# Patient Record
Sex: MALE | Race: ASIAN
[De-identification: names, ages, dates, MRNs, and addresses within clinical notes are randomized per-mention and may not be internally consistent; named-entity substitution may affect disease eponyms.]

---

## 2018-03-10 ENCOUNTER — HOSPITAL ENCOUNTER (OUTPATIENT)
Age: 73
Setting detail: OBSERVATION
LOS: 1 days | Discharge: HOME | End: 2018-03-11

## 2018-06-25 ENCOUNTER — HOSPITAL ENCOUNTER (EMERGENCY)
Dept: HOSPITAL 91 - E/R | Age: 73
Discharge: HOME | End: 2018-06-25
Payer: COMMERCIAL

## 2018-06-25 ENCOUNTER — HOSPITAL ENCOUNTER (EMERGENCY)
Age: 73
Discharge: HOME | End: 2018-06-25

## 2018-06-25 DIAGNOSIS — Z79.01: ICD-10-CM

## 2018-06-25 DIAGNOSIS — D64.9: ICD-10-CM

## 2018-06-25 DIAGNOSIS — R10.13: ICD-10-CM

## 2018-06-25 DIAGNOSIS — Z91.15: ICD-10-CM

## 2018-06-25 DIAGNOSIS — R55: ICD-10-CM

## 2018-06-25 DIAGNOSIS — Z95.1: ICD-10-CM

## 2018-06-25 DIAGNOSIS — S16.1XXA: Primary | ICD-10-CM

## 2018-06-25 DIAGNOSIS — I25.10: ICD-10-CM
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
## 2018-06-25 DIAGNOSIS — N18.9: ICD-10-CM

## 2018-06-25 DIAGNOSIS — E87.5: ICD-10-CM

## 2018-06-25 LAB
ADD MAN DIFF?: NO
ALANINE AMINOTRANSFERASE: 24 IU/L (ref 13–69)
ALBUMIN: 4.3 G/DL (ref 3.3–4.9)
ALKALINE PHOSPHATASE: 111 IU/L (ref 42–121)
ANION GAP: 20 (ref 8–16)
ASPARTATE AMINO TRANSFERASE: 26 IU/L (ref 15–46)
BASOPHIL #: 0 10^3/UL (ref 0–0.1)
BASOPHILS %: 0.3 % (ref 0–2)
BILIRUBIN,DIRECT: 0 MG/DL (ref 0–0.2)
BILIRUBIN,TOTAL: 0.3 MG/DL (ref 0.2–1.3)
BLOOD UREA NITROGEN: 77 MG/DL (ref 7–20)
CALCIUM: 9.7 MG/DL (ref 8.4–10.2)
CARBON DIOXIDE: 25 MMOL/L (ref 21–31)
CHLORIDE: 102 MMOL/L (ref 97–110)
CREATININE: 13.27 MG/DL (ref 0.61–1.24)
EOSINOPHILS #: 0.2 10^3/UL (ref 0–0.5)
EOSINOPHILS %: 2.8 % (ref 0–7)
GLUCOSE: 78 MG/DL (ref 70–220)
HEMATOCRIT: 36.9 % (ref 42–52)
HEMOGLOBIN: 11.5 G/DL (ref 14–18)
INR: 1.41
LIPASE: 337 U/L (ref 23–300)
LYMPHOCYTES #: 1.5 10^3/UL (ref 0.8–2.9)
LYMPHOCYTES %: 22.1 % (ref 15–51)
MEAN CORPUSCULAR HEMOGLOBIN: 30.7 PG (ref 29–33)
MEAN CORPUSCULAR HGB CONC: 31.2 G/DL (ref 32–37)
MEAN CORPUSCULAR VOLUME: 98.7 FL (ref 82–101)
MEAN PLATELET VOLUME: 9 FL (ref 7.4–10.4)
MONOCYTE #: 1.2 10^3/UL (ref 0.3–0.9)
MONOCYTES %: 17.6 % (ref 0–11)
NEUTROPHIL #: 3.9 10^3/UL (ref 1.6–7.5)
NEUTROPHILS %: 56.9 % (ref 39–77)
NUCLEATED RED BLOOD CELLS #: 0 10^3/UL (ref 0–0)
NUCLEATED RED BLOOD CELLS%: 0 /100WBC (ref 0–0)
PARTIAL THROMBOPLASTIN TIME: 36 SEC (ref 25–35)
PLATELET COUNT: 164 10^3/UL (ref 140–415)
POTASSIUM: 6 MMOL/L (ref 3.5–5.1)
PROTIME: 17.5 SEC (ref 11.9–14.9)
PT RATIO: 1.4
RED BLOOD COUNT: 3.74 10^6/UL (ref 4.7–6.1)
RED CELL DISTRIBUTION WIDTH: 14.2 % (ref 11.5–14.5)
SODIUM: 141 MMOL/L (ref 135–144)
TOTAL PROTEIN: 8.5 G/DL (ref 6.1–8.1)
WHITE BLOOD COUNT: 6.8 10^3/UL (ref 4.8–10.8)

## 2018-06-25 PROCEDURE — 85025 COMPLETE CBC W/AUTO DIFF WBC: CPT

## 2018-06-25 PROCEDURE — 36415 COLL VENOUS BLD VENIPUNCTURE: CPT

## 2018-06-25 PROCEDURE — 80048 BASIC METABOLIC PNL TOTAL CA: CPT

## 2018-06-25 PROCEDURE — 80076 HEPATIC FUNCTION PANEL: CPT

## 2018-06-25 PROCEDURE — 74176 CT ABD & PELVIS W/O CONTRAST: CPT

## 2018-06-25 PROCEDURE — 99285 EMERGENCY DEPT VISIT HI MDM: CPT

## 2018-06-25 PROCEDURE — 96375 TX/PRO/DX INJ NEW DRUG ADDON: CPT

## 2018-06-25 PROCEDURE — 94664 DEMO&/EVAL PT USE INHALER: CPT

## 2018-06-25 PROCEDURE — 83690 ASSAY OF LIPASE: CPT

## 2018-06-25 PROCEDURE — 71045 X-RAY EXAM CHEST 1 VIEW: CPT

## 2018-06-25 PROCEDURE — 93005 ELECTROCARDIOGRAM TRACING: CPT

## 2018-06-25 PROCEDURE — 72125 CT NECK SPINE W/O DYE: CPT

## 2018-06-25 PROCEDURE — 70450 CT HEAD/BRAIN W/O DYE: CPT

## 2018-06-25 PROCEDURE — 96374 THER/PROPH/DIAG INJ IV PUSH: CPT

## 2018-06-25 PROCEDURE — 85730 THROMBOPLASTIN TIME PARTIAL: CPT

## 2018-06-25 PROCEDURE — 85610 PROTHROMBIN TIME: CPT

## 2018-06-25 RX ADMIN — CALCIUM CHLORIDE 1 MG: 100 INJECTION INTRAVENOUS; INTRAVENTRICULAR at 10:51

## 2018-06-25 RX ADMIN — ACETAMINOPHEN 1 MG: 325 TABLET, FILM COATED ORAL at 09:35

## 2018-06-25 RX ADMIN — SODIUM BICARBONATE 1 ML: 84 INJECTION INTRAVENOUS at 10:51

## 2018-06-25 RX ADMIN — ALBUTEROL SULFATE 1 MG: 2.5 SOLUTION RESPIRATORY (INHALATION) at 11:11

## 2019-01-11 ENCOUNTER — HOSPITAL ENCOUNTER (OUTPATIENT)
Dept: HOSPITAL 91 - E/R | Age: 74
Setting detail: OBSERVATION
LOS: 1 days | Discharge: HOME | End: 2019-01-12
Payer: COMMERCIAL

## 2019-01-11 ENCOUNTER — HOSPITAL ENCOUNTER (OUTPATIENT)
Dept: HOSPITAL 10 - E/R | Age: 74
Setting detail: OBSERVATION
LOS: 1 days | Discharge: HOME | End: 2019-01-12
Attending: INTERNAL MEDICINE | Admitting: INTERNAL MEDICINE
Payer: COMMERCIAL

## 2019-01-11 VITALS — RESPIRATION RATE: 18 BRPM | SYSTOLIC BLOOD PRESSURE: 132 MMHG | HEART RATE: 75 BPM | DIASTOLIC BLOOD PRESSURE: 71 MMHG

## 2019-01-11 VITALS — DIASTOLIC BLOOD PRESSURE: 75 MMHG | HEART RATE: 87 BPM | SYSTOLIC BLOOD PRESSURE: 135 MMHG

## 2019-01-11 VITALS
HEIGHT: 64 IN | HEIGHT: 64 IN | WEIGHT: 160.94 LBS | BODY MASS INDEX: 27.48 KG/M2 | WEIGHT: 160.94 LBS | BODY MASS INDEX: 27.48 KG/M2

## 2019-01-11 VITALS — DIASTOLIC BLOOD PRESSURE: 80 MMHG | SYSTOLIC BLOOD PRESSURE: 126 MMHG | HEART RATE: 90 BPM

## 2019-01-11 VITALS — RESPIRATION RATE: 12 BRPM | HEART RATE: 75 BPM | DIASTOLIC BLOOD PRESSURE: 71 MMHG | SYSTOLIC BLOOD PRESSURE: 132 MMHG

## 2019-01-11 VITALS — HEART RATE: 85 BPM | SYSTOLIC BLOOD PRESSURE: 157 MMHG | DIASTOLIC BLOOD PRESSURE: 85 MMHG

## 2019-01-11 VITALS — SYSTOLIC BLOOD PRESSURE: 130 MMHG | HEART RATE: 98 BPM | DIASTOLIC BLOOD PRESSURE: 67 MMHG

## 2019-01-11 VITALS — HEART RATE: 88 BPM | SYSTOLIC BLOOD PRESSURE: 136 MMHG | DIASTOLIC BLOOD PRESSURE: 75 MMHG

## 2019-01-11 VITALS — SYSTOLIC BLOOD PRESSURE: 141 MMHG | HEART RATE: 77 BPM | DIASTOLIC BLOOD PRESSURE: 77 MMHG

## 2019-01-11 VITALS — HEART RATE: 86 BPM | SYSTOLIC BLOOD PRESSURE: 123 MMHG | DIASTOLIC BLOOD PRESSURE: 76 MMHG

## 2019-01-11 VITALS — SYSTOLIC BLOOD PRESSURE: 130 MMHG | DIASTOLIC BLOOD PRESSURE: 74 MMHG | HEART RATE: 78 BPM

## 2019-01-11 VITALS — SYSTOLIC BLOOD PRESSURE: 127 MMHG | HEART RATE: 84 BPM | DIASTOLIC BLOOD PRESSURE: 78 MMHG

## 2019-01-11 VITALS — HEART RATE: 85 BPM | DIASTOLIC BLOOD PRESSURE: 84 MMHG | SYSTOLIC BLOOD PRESSURE: 130 MMHG

## 2019-01-11 VITALS — RESPIRATION RATE: 17 BRPM | HEART RATE: 90 BPM | DIASTOLIC BLOOD PRESSURE: 62 MMHG | SYSTOLIC BLOOD PRESSURE: 128 MMHG

## 2019-01-11 VITALS — SYSTOLIC BLOOD PRESSURE: 111 MMHG | HEART RATE: 73 BPM | RESPIRATION RATE: 17 BRPM | DIASTOLIC BLOOD PRESSURE: 71 MMHG

## 2019-01-11 VITALS — HEART RATE: 78 BPM | SYSTOLIC BLOOD PRESSURE: 122 MMHG | DIASTOLIC BLOOD PRESSURE: 58 MMHG | RESPIRATION RATE: 18 BRPM

## 2019-01-11 VITALS — HEART RATE: 87 BPM

## 2019-01-11 VITALS — HEART RATE: 89 BPM | DIASTOLIC BLOOD PRESSURE: 72 MMHG | SYSTOLIC BLOOD PRESSURE: 142 MMHG

## 2019-01-11 VITALS — DIASTOLIC BLOOD PRESSURE: 79 MMHG | HEART RATE: 82 BPM | SYSTOLIC BLOOD PRESSURE: 141 MMHG

## 2019-01-11 VITALS — RESPIRATION RATE: 18 BRPM | DIASTOLIC BLOOD PRESSURE: 80 MMHG | HEART RATE: 92 BPM | SYSTOLIC BLOOD PRESSURE: 142 MMHG

## 2019-01-11 VITALS — SYSTOLIC BLOOD PRESSURE: 117 MMHG | HEART RATE: 82 BPM | DIASTOLIC BLOOD PRESSURE: 75 MMHG

## 2019-01-11 VITALS — HEART RATE: 84 BPM

## 2019-01-11 DIAGNOSIS — Z99.2: ICD-10-CM

## 2019-01-11 DIAGNOSIS — Z79.01: ICD-10-CM

## 2019-01-11 DIAGNOSIS — N18.6: ICD-10-CM

## 2019-01-11 DIAGNOSIS — I48.2: ICD-10-CM

## 2019-01-11 DIAGNOSIS — D63.1: ICD-10-CM

## 2019-01-11 DIAGNOSIS — I50.9: ICD-10-CM

## 2019-01-11 DIAGNOSIS — I13.2: Primary | ICD-10-CM

## 2019-01-11 DIAGNOSIS — I25.10: ICD-10-CM

## 2019-01-11 DIAGNOSIS — E87.5: ICD-10-CM

## 2019-01-11 LAB
ADD MAN DIFF?: NO
ANION GAP: 16 (ref 5–13)
BASOPHIL #: 0 10^3/UL (ref 0–0.1)
BASOPHILS %: 0.3 % (ref 0–2)
BLOOD UREA NITROGEN: 79 MG/DL (ref 7–20)
CALCIUM: 9.4 MG/DL (ref 8.4–10.2)
CARBON DIOXIDE: 17 MMOL/L (ref 21–31)
CHLORIDE: 108 MMOL/L (ref 97–110)
CREATININE: 12.76 MG/DL (ref 0.61–1.24)
EOSINOPHILS #: 0.4 10^3/UL (ref 0–0.5)
EOSINOPHILS %: 3.2 % (ref 0–7)
GLUCOSE: 93 MG/DL (ref 70–220)
HAAIG REFLEX: (no result)
HEMATOCRIT: 30.8 % (ref 42–52)
HEMOGLOBIN: 9.8 G/DL (ref 14–18)
HEPATITIS B CORE ANTIBODY: NEGATIVE
HEPATITIS B SURFACE ANTIGEN: NEGATIVE
HEPATITIS C VIRAL ANTIBODY: NEGATIVE
IMMATURE GRANS #M: 0.12 10^3/UL (ref 0–0.03)
IMMATURE GRANS % (M): 1 % (ref 0–0.43)
INR: 1.03
LACTIC ACID: 0.6 MMOL/L (ref 0.5–2)
LYMPHOCYTES #: 1.3 10^3/UL (ref 0.8–2.9)
LYMPHOCYTES %: 10.4 % (ref 15–51)
MEAN CORPUSCULAR HEMOGLOBIN: 30.1 PG (ref 29–33)
MEAN CORPUSCULAR HGB CONC: 31.8 G/DL (ref 32–37)
MEAN CORPUSCULAR VOLUME: 94.5 FL (ref 82–101)
MEAN PLATELET VOLUME: 9.6 FL (ref 7.4–10.4)
MONOCYTE #: 0.8 10^3/UL (ref 0.3–0.9)
MONOCYTES %: 6.6 % (ref 0–11)
NEUTROPHIL #: 9.9 10^3/UL (ref 1.6–7.5)
NEUTROPHILS %: 78.5 % (ref 39–77)
NUCLEATED RED BLOOD CELLS #: 0 10^3/UL (ref 0–0)
NUCLEATED RED BLOOD CELLS%: 0 /100WBC (ref 0–0)
PLATELET COUNT: 238 10^3/UL (ref 140–415)
POTASSIUM: 7.1 MMOL/L (ref 3.5–5.1)
PROTIME: 13.6 SEC (ref 11.9–14.9)
PT RATIO: 1.1
RED BLOOD COUNT: 3.26 10^6/UL (ref 4.7–6.1)
RED CELL DISTRIBUTION WIDTH: 15.9 % (ref 11.5–14.5)
SODIUM: 141 MMOL/L (ref 135–144)
TROPONIN-I: 0.03 NG/ML (ref 0–0.12)
WHITE BLOOD COUNT: 12.6 10^3/UL (ref 4.8–10.8)

## 2019-01-11 PROCEDURE — 85025 COMPLETE CBC W/AUTO DIFF WBC: CPT

## 2019-01-11 PROCEDURE — 83036 HEMOGLOBIN GLYCOSYLATED A1C: CPT

## 2019-01-11 PROCEDURE — 86709 HEPATITIS A IGM ANTIBODY: CPT

## 2019-01-11 PROCEDURE — G0257 UNSCHED DIALYSIS ESRD PT HOS: HCPCS

## 2019-01-11 PROCEDURE — 84484 ASSAY OF TROPONIN QUANT: CPT

## 2019-01-11 PROCEDURE — 80048 BASIC METABOLIC PNL TOTAL CA: CPT

## 2019-01-11 PROCEDURE — 90935 HEMODIALYSIS ONE EVALUATION: CPT

## 2019-01-11 PROCEDURE — 87340 HEPATITIS B SURFACE AG IA: CPT

## 2019-01-11 PROCEDURE — 87040 BLOOD CULTURE FOR BACTERIA: CPT

## 2019-01-11 PROCEDURE — 36415 COLL VENOUS BLD VENIPUNCTURE: CPT

## 2019-01-11 PROCEDURE — 86704 HEP B CORE ANTIBODY TOTAL: CPT

## 2019-01-11 PROCEDURE — 93005 ELECTROCARDIOGRAM TRACING: CPT

## 2019-01-11 PROCEDURE — 71045 X-RAY EXAM CHEST 1 VIEW: CPT

## 2019-01-11 PROCEDURE — 85610 PROTHROMBIN TIME: CPT

## 2019-01-11 PROCEDURE — 86803 HEPATITIS C AB TEST: CPT

## 2019-01-11 PROCEDURE — 83605 ASSAY OF LACTIC ACID: CPT

## 2019-01-11 PROCEDURE — 99291 CRITICAL CARE FIRST HOUR: CPT

## 2019-01-11 RX ADMIN — CEFTRIAXONE 1 MLS/HR: 1 INJECTION, SOLUTION INTRAVENOUS at 14:57

## 2019-01-11 RX ADMIN — EPOETIN ALFA 1 UNITS: 3000 SOLUTION INTRAVENOUS; SUBCUTANEOUS at 21:11

## 2019-01-11 RX ADMIN — RENAGEL 1 MG: 800 TABLET ORAL at 21:54

## 2019-01-11 RX ADMIN — WARFARIN SODIUM 1 MG: 2 TABLET ORAL at 21:52

## 2019-01-11 RX ADMIN — AZITHROMYCIN MONOHYDRATE 1 MLS/HR: 500 INJECTION, POWDER, LYOPHILIZED, FOR SOLUTION INTRAVENOUS at 14:58

## 2019-01-11 NOTE — ERD
ER Documentation


Chief Complaint


Chief Complaint





SOB, DIALYSIS PT, MISSED DIALYSIS SESSION X2





HPI


73-year-old male with a history of ESRD on hemodialysis brought in by his wife 


for shortness of breath.  Reportedly the patient was in the [facility] for over


1 month.  He was doing dialysis there, last one done was 2 days ago.  He was 


told prior to travel that when he comes back to the [place], to go immediately to 


the ER.  It is unclear why he was given this advice. He denies leg swelling, 


phlegm,  fever, chills, chest pain, palpitations, back pain, or dizziness.





ROS


All systems reviewed and are negative except as per history of present illness.





Medications


Home Meds


Reported Medications


Warfarin Sodium* (Coumadin*) 3 Mg Tablet, 3.5 MG PO EVERY OTHER DAY , TAB


   **ALTERNATE WITH COUMADIN 4 MG**


   1/11/19


Amiodarone Hcl* (Pacerone*) 100 Mg Tablet, 50 MG PO DAILY, TAB


   1/11/19


Warfarin Sodium* (Warfarin Sodium*) 4 Mg Tablet, 4 MG PO DAY 2 TO DAY 7, TAB


   6/25/18


Cholecalciferol* (Vitamin D3*) 1,000 Unit Tablet, 1000 UNIT PO DAILY, TAB


   6/25/18


Multivit/Ca Carb/B Cmplx/Fa* (Kena-Abdi*) 1 Tab Tab, 1 TAB PO DAILY


   6/25/18


Sevelamer Carbonate* (Renvela*) 2.4 Gm Powd.pack, 1 PACKET PO DAILY


   6/25/18


Sevelamer Hcl* (Renagel*) 800 Mg Tablet, 800 MG PO WITH MEALS, TAB


   8/9/15


Ferrous Sulfate* (Ferrous Sulfate*) 325 Mg Tabec, 325 MG PO DAILY, TAB


   8/9/15


Folic Acid* (Folic Acid*) 1 Mg Tablet, 1 MG PO DAILY, TAB


   8/9/15


Discontinued Reported Medications


Warfarin Sodium* (Coumadin*) 10 Mg Tablet, 10 MG PO DAY 1 THEN ALTERNATE, TAB


   6/25/18





Allergies


Allergies:  


Coded Allergies:  


     ciprofloxacin (Verified  Allergy, Unknown, 1/11/19)


   


   rashes


     ciprofloxacin HCl (Verified  Allergy, Unknown, 1/11/19)


   


   rashes


Uncoded Allergies:  


     NEOMYCIN OINTMENT (Allergy, Unknown, 6/19/14)





PMhx/Soc


History of Surgery:  Yes (ANGIOPLASTY, CABG, PROSTATE TURP, AV FISTULA)


Anesthesia Reaction:  No


Hx Neurological Disorder:  No


Hx Respiratory Disorders:  No


Hx Cardiac Disorders:  Yes (CAD, HTN)


Hx Psychiatric Problems:  No


Hx Miscellaneous Medical Probl:  Yes (GOUT, ESRD on HD MWF, prostate disease)


Hx Alcohol Use:  No


Hx Substance Use:  No


Hx Tobacco Use:  No


Smoking Status:  Never smoker





FmHx


Family History:  No diabetes





Physical Exam


Vitals





Vital Signs


  Date      Temp  Pulse  Resp  B/P (MAP)   Pulse Ox  O2          O2 Flow    FiO2


Time                                                 Delivery    Rate


   1/11/19  97.5     87    22      136/77        96


     12:51                           (96)





Physical Exam


Const:   No acute distress


Head:   Atraumatic 


Eyes:    Normal Conjunctiva


ENT:    Normal External Ears, Nose and Mouth.


Neck:               Full range of motion. No meningismus.


Resp:   No tachypnea. Diminished breath sounds bilaterally. No wheezing, rales 


or rhonchi


Cardio:   Regular rate and rhythm, no murmurs


Abd:    Soft, non tender, non distended. Normal bowel sounds


Skin:   No petechiae or rashes


Back:   No midline or flank tenderness


Ext:    No cyanosis, or edema. Bilateral knee swelling, chronic. No calf 


tenderness. 


Neur:   Awake and alert


Psych:    Normal Mood and Affect


Result Diagram:  


1/11/19 1320                                                                    


           1/12/19 0508





Results 24 hrs





Laboratory Tests


       Test
                                 1/11/19
13:20  1/11/19
13:53


       White Blood Count                     12.6 10^3/ul


       Red Blood Count                       3.26 10^6/ul


       Hemoglobin                                9.8 g/dl


       Hematocrit                                  30.8 %


       Mean Corpuscular Volume                    94.5 fl


       Mean Corpuscular Hemoglobin                30.1 pg


       Mean Corpuscular Hemoglobin
Concent     31.8 g/dl 
  



       Red Cell Distribution Width                 15.9 %


       Platelet Count                         238 10^3/UL


       Mean Platelet Volume                        9.6 fl


       Immature Granulocytes %                    1.000 %


       Neutrophils %                               78.5 %


       Lymphocytes %                               10.4 %


       Monocytes %                                  6.6 %


       Eosinophils %                                3.2 %


       Basophils %                                  0.3 %


       Nucleated Red Blood Cells %            0.0 /100WBC


       Immature Granulocytes #              0.120 10^3/ul


       Neutrophils #                          9.9 10^3/ul


       Lymphocytes #                          1.3 10^3/ul


       Monocytes #                            0.8 10^3/ul


       Eosinophils #                          0.4 10^3/ul


       Basophils #                            0.0 10^3/ul


       Nucleated Red Blood Cells #            0.0 10^3/ul


       POC Venous Lactate                                     1.3 mmol/L





Current Medications


 Medications
   Dose
          Sig/Tamiko
       Start Time
   Status  Last


 (Trade)       Ordered        Route
 PRN     Stop Time              Admin
Dose


                              Reason                                Admin


 Ceftriaxone    50 ml @ 
      ONCE  STAT
    1/11/19       DC           1/11/19


Sodium         100 mls/hr     IVPB
          13:34
                       14:57



                                             1/11/19 14:03


 Azithromycin   250 ml @ 
     ONCE  STAT
    1/11/19       DC           1/11/19


               250 mls/hr     IV
            13:34
                       14:58



                                             1/11/19 14:33


 Ondansetron    4 mg           BRIDGE ORDER   1/11/19       DC       



HCl
  (Zofran                 PRN
 IV
       14:00



Inj)                          NAUSEA AND/OR  1/11/19 17:42


                              VOMITING


                650 mg         ER BRIDGE      1/11/19       DC       



Acetaminophen                 PRN
 PO
 MILD  14:00




  (Tylenol                   PAIN(1-3)OR    1/11/19 17:42


Tab)                          ELEVATED TEMP








Procedures/MDM


EMERGENT LABS AND DIAGNOSTIC STUDIES: 


Lab Results above were reviewed and interpreted by me. 





CBC: mild leukocytosis


BMP: Critically elevated potassium.  Evidence of chronic renal failure with mild


acidosis.  


Lactate within normal limits, no evidence of hypoperfusion or severe sepsis


Troponin within normal limits, not indicative of cardiac ischemia





12-lead EKG was interpreted by DAPHNE Valle MD: 


Afib at 99 beats per minute


RBBB


No acute ST or T wave changes suggestive of acute ischemia or STEMI. 


___________________________________________________________ 


Radiology Results as interpreted by Radiology below were reviewed by TRINY Valle MD: 





Chest Xray: 


IMPRESSION:


 


1. Cardiomegaly and mild central pulmonary vascular congestion with bibasilar 


opacities; pneumonia versus atelectasis and small pleural effusions.





_____________________________________________ 


Physician Juvencio           Date    Time 


Electronically viewed and signed by Physician Juvencio on 01/11/2019 13:28 


 





___________________________________________________________ 


Initial Nursing notes reviewed. 


Previous Medical Records requested via the Electronic Health Record. 





EMERGENCY DEPARTMENT COURSE / MEDICAL DECISION MAKING: 





Patient is presenting to the ER with shortness of breath.  Vitals were notable 


for mild tachypnea but no hypoxia on room air.  I spoke with the nephrologist 


on-call for his primary nephrologist, Dr. Hay.  He states if the patient has 


been gone for longer than 30 days of the country, the dialysis clinic will 


automatically discharge these patients.  He will need multiple tests done prior 


to readmission to the dialysis center.  This may take a few days and the patient


needs dialysis.  I ordered the necessary tests as requested by the nephrologist.


 He does have signs of fluid overload on his chest x-ray.  There are no signs of


impending respiratory failure at this time.  His labs were notable for critical 


hyperkalemia, so the patient will be admitted for dialysis and we will defer 


further testing and arrangements for outpatient dialysis to the inpatient team. 


Hyperkalemia was not treated in the ED as the patient did not have acute EKG 


changes consistent with hyperkalemia.  Since dialysis was planned today, no 


medical treatment was done for hyperkalemia.  During his ED course, the patient 


remained stable until he was admitted.





Critical Care Time: 35 minutes





Treatments/Evaluations: Close monitoring and treatment of unstable vital signs, 


cardiorespiratory, and neurologic status, while maintaining tight balance of 


fluid, respiratory, and cardiac interventions. This time includes discussing the


case with the patient and the patients family. This time does not include all 


procedures stated elsewhere in this record. This time also includes reviewing 


old records, labs and radiological studies. This time includes examining and re-


examining the patient. Additionally, this time also includes arranging care with


admitting and consulting physicians.








Accepting Care Team:


Current data and ongoing care discussed.


Time:                      Time of admission


Primary Provider:      Dr. Ye


Consulting:                  Dr. Abdalla with nephrology


Outstanding Data:       none











LESLY VALLE MD         Jan 11, 2019 13:32

## 2019-01-11 NOTE — HP
DATE OF ADMISSION: 01/11/2019

 

CHIEF COMPLAINT:  Need for dialysis.

 

HISTORY OF PRESENT ILLNESS:  A 73-year-old with a long history of end-stage renal disease, on hemodia
lysis for about 4 years, presented to the emergency room reporting that he meets hemodialysis.  The p
atient was in the [facility] for more than 1 month.  Last dialysis was 2 days prior to admission.  H
e denies any chest pain or shortness of breath.  He denies any abdominal pain.  No nausea, no vomitin
g, no fevers or chills.  However, initial labs revealed a potassium of 7.1, a BUN of 79 and creatinin
e of 12.76.  The patient will not be accepted to dialysis center until he has repeat hepatitis panel 
testing.

 

PAST MEDICAL HISTORY:

1.  Hypertension.

2.  Chronic atrial fibrillation.

3.  End-stage renal disease, on hemodialysis.

 

MEDICATIONS PRIOR TO ADMISSION:

1.  Coumadin.

2.  Amiodarone.

3.  Renvela.

4.  Vitamin D3.

5.  Kena-Abdi.

6.  Folic acid.

7.  Ferrous sulfate.

 

PHYSICAL EXAMINATION:

GENERAL:  Well-developed, well-nourished male who was in no apparent distress.

VITAL SIGNS:  Stable.  He is afebrile.

HEENT:  Extraocular muscles are intact.  Pupils are equal, reactive to light bilaterally.  Sclerae ar
e anicteric.  Oropharynx is clear and moist.

NECK:  Supple.  No JVD, no carotid bruits.

LUNGS:  Clear to auscultation bilaterally.

CARDIAC:  Regular rate and rhythm.  No murmurs, rubs or gallops.

ABDOMEN:  Soft, nontender, nondistended.  Normoactive bowel sounds.

EXTREMITIES:  No clubbing, cyanosis or edema.

NEUROLOGIC:  Grossly nonfocal.

 

ASSESSMENT AND PLAN:

1.  A 73-year-old male with end-stage renal disease, in need of urgent hemodialysis.

2.  Hypertension.

3.  Chronic atrial fibrillation.

 

PLAN:

1.  Place in tele observation.  Proceed with urgent hemodialysis.  Kayexalate is to be given in the e
mergency room.

2.  Resume home medications.

3.  Check hepatitis B and C panel..

4.  Dr. Castro was notified of this admission.  He will order urgent hemodialysis.

 

 

Dictated By: MEGAN DAWN/KIM

DD:    01/11/2019 15:50:51

DT:    01/11/2019 16:32:32

Conf#: [id number]

DID#:  [id number]

CC: JON CASTRO MD; LESLY CARDOZO MD;*[facility]*

## 2019-01-11 NOTE — CONS
Date/Time of Note


Date/Time of Note


DATE: 1/11/19 


TIME: 17:51





Assessment/Plan


72 yo Male with





1) Hyperkalemia


2) ESRD on HD


3) LUE AVF


3) Metabolic Acidosis


4) Hx of CAD/CABG


5) Anemia, CKD


6) CM with CHF


7) Afib, Chronic, Rate controlled, On coumadin





Urgent HD ordered


HD nurse at bedside


Monitor BP and HR on telemetry


Check BMP in am


CXR reviewed


Hep Series


CM for placement to [facility] 2nd shift MWF or [facility] 2nd shift MWF





Thank you


Result Diagram:  


1/11/19 1320                                                                    


           1/11/19 1420





Results 24hrs





Laboratory Tests


Test
                                1/11/19
13:20  1/11/19
13:53  1/11/19
14:20


White Blood Count                         12.6  #H


Red Blood Count                            3.26  L


Hemoglobin                                  9.8  L


Hematocrit                                 30.8  L


Mean Corpuscular Volume                     94.5


Mean Corpuscular Hemoglobin                 30.1


Mean Corpuscular Hemoglobin
Concent       31.8  L
  
              



Red Cell Distribution Width                15.9  H


Platelet Count                              238  #


Mean Platelet Volume                         9.6


Immature Granulocytes %                   1.000  H


Neutrophils %                              78.5  H


Lymphocytes %                              10.4  L


Monocytes %                                  6.6


Eosinophils %                                3.2


Basophils %                                  0.3


Nucleated Red Blood Cells %                  0.0


Immature Granulocytes #                   0.120  H


Neutrophils #                               9.9  H


Lymphocytes #                                1.3


Monocytes #                                  0.8


Eosinophils #                                0.4


Basophils #                                  0.0


Nucleated Red Blood Cells #                  0.0


POC Venous Lactate                                          1.3


Sodium Level                                                               141


Potassium Level                                                          7.1  *H


Chloride Level                                                             108


Carbon Dioxide Level                                                       17  L


Anion Gap                                                                  16  H


Blood Urea Nitrogen                                                        79  H


Creatinine                                                              12.76  H


Est Glomerular Filtrat Rate
mL/min   
              
                



Glucose Level                                                               93


Calcium Level                                                              9.4


Troponin I                                                               0.033


Hepatitis B Surface Antigen                                        NEGATIVE


Hepatitis B Core Total
Antibody      
              
              NEGATIVE  



Hepatitis C Antibody                                               NEGATIVE








Consultation Date/Type/Reason


Admit Date/Time


Jan 11, 2019 at 14:01


Date of Consultation:  Jan 11, 2019


Type of Consult


Renal


Reason for Consultation


ESRD


Requesting Provider:  MEGAN DONG MD





Hx of Present Illness


 73-year-old [place] male with a long history of end-stage renal disease, on 


hemodialysis for 4 years at [facility] however travelled back home and 


presented back > 30 days. Patient will need re admission to [facility]. Pt 


complained of SOB in ED, found to have hyperkalemia requiring urgent HD. 


Nephrology consulted for management of ESRD. Pt with working [employer]. He denies 


any abdominal pain.  No nausea, no vomiting, no fevers or chills.


Constitutional:  No requiring O2


Additional Comments


as above





Past Medical History


Medical History:  hypertension, renal disease


Medications





Current Medications


IV Flush (NS 3 ml) 3 ml PER PROTOCOL IV ;  Start 1/11/19 at 15:30


Ondansetron HCl (Zofran Inj) 4 mg Q6H  PRN IV NAUSEA AND/OR VOMITING;  Start 


1/11/19 at 15:30


Acetaminophen (Tylenol Tab) 650 mg Q6H  PRN PO PAIN LEVEL 1-3 OR FEVER;  Start 


1/11/19 at 15:30


Zolpidem Tartrate (Ambien) 5 mg QHS  PRN PO INSOMNIA;  Start 1/11/19 at 15:30


Docusate Sodium (Colace) 100 mg Q12H  PRN PO CONSTIPATION;  Start 1/11/19 at 


15:30


Enoxaparin Sodium (Lovenox) 30 mg DAILY SC ;  Start 1/12/19 at 09:00


Amiodarone HCl (Cordarone) 50 mg DAILY PO ;  Start 1/12/19 at 09:00


Multivit/Ca Carb/ B Cmplx/FA/Prenat (Kena-Abdi) 1 tab DAILY PO ;  Start 1/12/19 


at 09:00


Sevelamer HCl (Renagel) 800 mg WITH  MEALS PO ;  Start 1/11/19 at 18:00


Warfarin Sodium (Coumadin) 4 mg DAILY PO ;  Start 1/12/19 at 09:00;  Status UNV


Allergies:  


Coded Allergies:  


     ciprofloxacin (Verified  Allergy, Unknown, 1/11/19)


   


   rashes


     ciprofloxacin HCl (Verified  Allergy, Unknown, 1/11/19)


   


   rashes


Uncoded Allergies:  


     NEOMYCIN OINTMENT (Allergy, Unknown, 6/19/14)





Past Surgical History


Past Surgical Hx:  other (cabg, avf)





Family History


Significant Family History:  no pertinent family hx





Social History


Alcohol Use:  none


Smoking Status:  Never smoker


Drug Use:  none





Exam/Review of Systems


Vital Signs


Vitals





Vital Signs


  Date      Temp  Pulse  Resp  B/P (MAP)   Pulse Ox  O2          O2 Flow    FiO2


Time                                                 Delivery    Rate


   1/11/19           87


     17:43


   1/11/19  97.6           18      132/71        98


     17:39                           (91)


   1/11/19                                           Room Air


     15:47








Exam


Constitutional:  No distress


Head:  normocephalic, atraumatic


Eyes:  EOMI


ENMT:  mucosa pink and moist


Respiratory:  crackles/rales; 


   No diminished breath sounds, No labored breathing


Cardiovascular:  regular rate and rhythm, edema


Gastrointestinal:  soft, non-tender; 


   No distended


Extremities:  other (LUE AVF, good thrill)


Neurological:  CNS II-XII intact, nl mental status; 


   No confused


Skin:  nl turgor; 


   No diaphoresis





Medications


Medications





Current Medications


IV Flush (NS 3 ml) 3 ml PER PROTOCOL IV ;  Start 1/11/19 at 15:30


Ondansetron HCl (Zofran Inj) 4 mg Q6H  PRN IV NAUSEA AND/OR VOMITING;  Start 


1/11/19 at 15:30


Acetaminophen (Tylenol Tab) 650 mg Q6H  PRN PO PAIN LEVEL 1-3 OR FEVER;  Start 


1/11/19 at 15:30


Zolpidem Tartrate (Ambien) 5 mg QHS  PRN PO INSOMNIA;  Start 1/11/19 at 15:30


Docusate Sodium (Colace) 100 mg Q12H  PRN PO CONSTIPATION;  Start 1/11/19 at 


15:30


Enoxaparin Sodium (Lovenox) 30 mg DAILY SC ;  Start 1/12/19 at 09:00


Amiodarone HCl (Cordarone) 50 mg DAILY PO ;  Start 1/12/19 at 09:00


Multivit/Ca Carb/ B Cmplx/FA/Prenat (Kena-Abdi) 1 tab DAILY PO ;  Start 1/12/19 


at 09:00


Sevelamer HCl (Renagel) 800 mg WITH  MEALS PO ;  Start 1/11/19 at 18:00


Warfarin Sodium (Coumadin) 4 mg DAILY PO ;  Start 1/12/19 at 09:00;  Status UNV





Imaging


Imaging


CXR


IMPRESSION:


 


1. Cardiomegaly and mild central pulmonary vascular congestion with bibasilar 


opacities; pneumonia versus atelectasis and small pleural effusions.


 


 


 


RPTAT: AAPP


_____________________________________________ 


Jeniffer Macedo, Physician           Date    Time 


Electronically viewed and signed by Jeniffer Macedo Physician on 01/11/2019 13:28











JON CAMPBELL MD               Jan 11, 2019 18:00

## 2019-01-12 VITALS — HEART RATE: 94 BPM

## 2019-01-12 VITALS — SYSTOLIC BLOOD PRESSURE: 109 MMHG | HEART RATE: 69 BPM | DIASTOLIC BLOOD PRESSURE: 60 MMHG | RESPIRATION RATE: 18 BRPM

## 2019-01-12 VITALS — RESPIRATION RATE: 18 BRPM | HEART RATE: 80 BPM | DIASTOLIC BLOOD PRESSURE: 55 MMHG | SYSTOLIC BLOOD PRESSURE: 110 MMHG

## 2019-01-12 VITALS — SYSTOLIC BLOOD PRESSURE: 133 MMHG | HEART RATE: 72 BPM | DIASTOLIC BLOOD PRESSURE: 61 MMHG | RESPIRATION RATE: 18 BRPM

## 2019-01-12 VITALS — HEART RATE: 93 BPM

## 2019-01-12 VITALS — HEART RATE: 127 BPM

## 2019-01-12 VITALS — HEART RATE: 80 BPM

## 2019-01-12 LAB
ANION GAP: 13 (ref 5–13)
BLOOD UREA NITROGEN: 41 MG/DL (ref 7–20)
CALCIUM: 8.9 MG/DL (ref 8.4–10.2)
CARBON DIOXIDE: 27 MMOL/L (ref 21–31)
CHLORIDE: 100 MMOL/L (ref 97–110)
CREATININE: 7.86 MG/DL (ref 0.61–1.24)
GLUCOSE: 86 MG/DL (ref 70–220)
HEMOGLOBIN A1C: 5.6 % (ref 0–5.9)
POTASSIUM: 4.7 MMOL/L (ref 3.5–5.1)
SODIUM: 140 MMOL/L (ref 135–144)

## 2019-01-12 RX ADMIN — Medication 1 TAB: at 08:15

## 2019-01-12 RX ADMIN — SEVELAMER CARBONATE 1 MG: 800 TABLET, FILM COATED ORAL at 08:15

## 2019-01-12 RX ADMIN — ENOXAPARIN SODIUM 1 MG: 100 INJECTION SUBCUTANEOUS at 08:21

## 2019-01-12 RX ADMIN — AMIODARONE HYDROCHLORIDE 1 MG: 200 TABLET ORAL at 08:16

## 2019-01-12 NOTE — CONS
Date/Time of Note


Date/Time of Note


DATE: 1/12/19 


TIME: 09:35





Assessment/Plan


72 yo Male with





1) Hyperkalemia


2) ESRD on HD


3) LUE AVF


3) Metabolic Acidosis


4) Hx of CAD/CABG


5) Anemia, CKD


6) CM with CHF


7) Afib, Chronic, Rate controlled, On coumadin





S/p HD


[place] [facility] [place]


Hep Series negative


DC planning


will follow up as outpt.


Result Diagram:  


1/11/19 1320                                                                    


           1/12/19 0508





Results 24hrs





Laboratory Tests


Test
                 1/11/19
13:20  1/11/19
13:53  1/11/19
14:20  1/11/19
18:08


White Blood Count          12.6  #H


Red Blood Count             3.26  L


Hemoglobin                   9.8  L


Hematocrit                  30.8  L


Mean Corpuscular             94.5


Volume


Mean Corpuscular             30.1


Hemoglobin


Mean Corpuscular           31.8  L
  
              
              



Hemoglobin
Concent


Red Cell                    15.9  H


Distribution Width


Platelet Count               238  #


Mean Platelet Volume          9.6


Immature                   1.000  H


Granulocytes %


Neutrophils %               78.5  H


Lymphocytes %               10.4  L


Monocytes %                   6.6


Eosinophils %                 3.2


Basophils %                   0.3


Nucleated Red Blood           0.0


Cells %


Immature                   0.120  H


Granulocytes #


Neutrophils #                9.9  H


Lymphocytes #                 1.3


Monocytes #                   0.8


Eosinophils #                 0.4


Basophils #                   0.0


Nucleated Red Blood           0.0


Cells #


POC Venous Lactate                           1.3


Sodium Level                                                141


Potassium Level                                           7.1  *H


Chloride Level                                              108


Carbon Dioxide Level                                        17  L


Anion Gap                                                   16  H


Blood Urea Nitrogen                                         79  H


Creatinine                                               12.76  H


Est Glomerular        
              
                
            



Filtrat Rate
mL/min


Glucose Level                                                93


Calcium Level                                               9.4


Troponin I                                                0.033


Hepatitis B Surface                                 NEGATIVE


Antigen


Hepatitis B Core      
              
              NEGATIVE  
    



Total
Antibody


Hepatitis C Antibody                                NEGATIVE


Prothrombin Time                                                         13.6  #


Prothrombin Time                                                           1.1


Ratio


INR International     
              
              
                    1.03  



Normalized
Ratio


Lactic Acid Level                                                          0.6


Test
                 1/12/19
05:08  
              
              



Sodium Level                  140


Potassium Level              4.7  #


Chloride Level                100


Carbon Dioxide Level          27  #


Anion Gap                      13


Blood Urea Nitrogen          41  #H


Creatinine                 7.86  #H


Est Glomerular          
            
              
              



Filtrat Rate
mL/min


Glucose Level                  86


Hemoglobin A1c                5.6


Calcium Level                 8.9








Consultation Date/Type/Reason


Admit Date/Time


Jan 11, 2019 at 14:01


Initial Consult Date


1/11/19


Type of Consult


Renal


Requesting Provider:  MEGAN DONG MD





24 HR Interval Summary


Free Text/Dictation


S/p HD


Improved


HD center placed.





Exam/Review of Systems


Vital Signs


Vitals





Vital Signs


  Date      Temp  Pulse  Resp  B/P (MAP)   Pulse Ox  O2          O2 Flow    FiO2


Time                                                 Delivery    Rate


   1/12/19           94


     09:01


   1/12/19  98.3           18      133/61       100  Room Air


     07:28                           (85)








Intake and Output





1/11/19 1/11/19 1/12/19





1515:00


23:00


07:00





IntakeIntake Total


300 ml


120 ml





OutputOutput Total


2400 ml


700 ml





BalanceBalance


-2100 ml


-580 ml














Exam


Constitutional:  No distress


ENMT:  mucosa pink and moist


Neck:  No jvd


Respiratory:  clear to auscultation; 


   No crackles/rales


Cardiovascular:  regular rate and rhythm; 


   No edema


Gastrointestinal:  soft


Extremities:  other (LUE AVF)


Neurological:  CNS II-XII intact, nl mental status; 


   No lethargic


Skin:  No diaphoresis





Medications


Medications





Current Medications


IV Flush (NS 3 ml) 3 ml PER PROTOCOL IV ;  Start 1/11/19 at 15:30


Ondansetron HCl (Zofran Inj) 4 mg Q6H  PRN IV NAUSEA AND/OR VOMITING;  Start 


1/11/19 at 15:30


Acetaminophen (Tylenol Tab) 650 mg Q6H  PRN PO PAIN LEVEL 1-3 OR FEVER;  Start 


1/11/19 at 15:30


Zolpidem Tartrate (Ambien) 5 mg QHS  PRN PO INSOMNIA;  Start 1/11/19 at 15:30


Docusate Sodium (Colace) 100 mg Q12H  PRN PO CONSTIPATION;  Start 1/11/19 at 


15:30


Enoxaparin Sodium (Lovenox) 30 mg DAILY SC  Last administered on 1/12/19at 


08:21; Admin Dose 30 MG;  Start 1/12/19 at 09:00


Amiodarone HCl (Cordarone) 50 mg DAILY PO  Last administered on 1/12/19at 08:16;


Admin Dose 50 MG;  Start 1/12/19 at 09:00


Multivit/Ca Carb/ B Cmplx/FA/Prenat (Kena-Abdi) 1 tab DAILY PO  Last 


administered on 1/12/19at 08:15; Admin Dose 1 TAB;  Start 1/12/19 at 09:00


Warfarin Sodium (Coumadin) 4 mg DAILY@1700 PO  Last administered on 1/11/19at 


21:52; Admin Dose 4 MG;  Start 1/11/19 at 20:12


Sevelamer Carbonate (Renvela) 800 mg WITH  MEALS PO  Last administered on 


1/12/19at 08:15; Admin Dose 800 MG;  Start 1/12/19 at 08:00











JON CAMPBELL MD               Jan 12, 2019 09:36

## 2019-01-12 NOTE — DS
DATE OF ADMISSION: 01/11/2019

DATE OF DISCHARGE: 01/12/2019

 

DISCHARGE DIAGNOSES:

1.  End-stage renal disease, on hemodialysis.

2.  Severe hyperkalemia.

3.  Status post urgent hemodialysis.

4.  Coronary artery disease.

5.  Hypertension.

 

PROCEDURE DURING HOSPITALIZATION:  Hemodialysis.

 

HOSPITAL COURSE:  A 73-year-old [place] gentleman with end-stage renal disease on hemodialysis, who 
was ____ for more than 1 month.  He was dialyzed 2 days prior to arrival to the emergency room.  The 
initial evaluation revealed a serum potassium of 7.1.  His nephrologist, Dr. Duggan was immediately c
ontacted and patient underwent urgent hemodialysis following admission.  A repeat potassium was 4.7 w
ith BUN of 41 and creatinine of 7.86.  The patient was asymptomatic at the time of discharge.  A hepa
titis B and C panels were negative.  The patient is in a stable condition for discharge.  He will und
ergo hemodialysis at [facility] on Monday following discharge.

 

DISPOSITION:  Discharge home.

 

DISCHARGE MEDICATIONS:

1.  Amiodarone 50 mg daily.

2.  Multivitamin 1 tablet daily.

3.  Renvela 800 mg ____

4.  Resume Coumadin at previously.

 

Follow up with PCP and Dr. Duggan as an outpatient.

 

 

Dictated By: MEGAN DAWN/NTS

DD:    01/12/2019 09:02:04

DT:    01/12/2019 18:07:39

Conf#: [id number]

DID#:  [id number]

CC: JON CAMPBELL MD;*EndCC*

## 2019-01-12 NOTE — PDOCDIS
Discharge Instructions


CONDITION


                 [id number]
Patient Condition:  [id number]
Good








HOME CARE INSTRUCTIONS:


            [id number]
Diet Instructions:  [id number]
Reduced Sodium


            [id number]
Special Diet:       [id number]
renal








ACTIVITY:


          [id number]
Activity Restrictions:  [id number]
No Restrictions


          [id number]
Bathing Restrictions:   [id number]
Shower








FOLLOW UP/APPOINTMENTS


Follow-up Plan


US renal on mon for dialysis











MEGAN DONG MD                  Jan 12, 2019 08:34

## 2019-09-04 ENCOUNTER — HOSPITAL ENCOUNTER (EMERGENCY)
Dept: HOSPITAL 10 - E/R | Age: 74
LOS: 1 days | Discharge: HOME | End: 2019-09-05
Payer: COMMERCIAL

## 2019-09-04 ENCOUNTER — HOSPITAL ENCOUNTER (EMERGENCY)
Dept: HOSPITAL 91 - E/R | Age: 74
LOS: 1 days | Discharge: HOME | End: 2019-09-05
Payer: COMMERCIAL

## 2019-09-04 VITALS
WEIGHT: 167.77 LBS | HEIGHT: 61 IN | WEIGHT: 167.77 LBS | BODY MASS INDEX: 31.68 KG/M2 | HEIGHT: 61 IN | BODY MASS INDEX: 31.68 KG/M2

## 2019-09-04 DIAGNOSIS — Z01.812: Primary | ICD-10-CM

## 2019-09-04 DIAGNOSIS — Z98.61: ICD-10-CM

## 2019-09-04 DIAGNOSIS — Z79.01: ICD-10-CM

## 2019-09-04 DIAGNOSIS — N18.6: ICD-10-CM

## 2019-09-04 DIAGNOSIS — Z87.891: ICD-10-CM

## 2019-09-04 DIAGNOSIS — I25.10: ICD-10-CM

## 2019-09-04 PROCEDURE — 86709 HEPATITIS A IGM ANTIBODY: CPT

## 2019-09-04 PROCEDURE — 87340 HEPATITIS B SURFACE AG IA: CPT

## 2019-09-04 PROCEDURE — 85025 COMPLETE CBC W/AUTO DIFF WBC: CPT

## 2019-09-04 PROCEDURE — 86803 HEPATITIS C AB TEST: CPT

## 2019-09-04 PROCEDURE — 80048 BASIC METABOLIC PNL TOTAL CA: CPT

## 2019-09-04 PROCEDURE — 86704 HEP B CORE ANTIBODY TOTAL: CPT

## 2019-09-04 PROCEDURE — 99284 EMERGENCY DEPT VISIT MOD MDM: CPT

## 2019-09-04 PROCEDURE — 71045 X-RAY EXAM CHEST 1 VIEW: CPT

## 2019-09-05 VITALS — RESPIRATION RATE: 20 BRPM | SYSTOLIC BLOOD PRESSURE: 135 MMHG | HEART RATE: 80 BPM | DIASTOLIC BLOOD PRESSURE: 76 MMHG

## 2019-09-05 LAB
ADD MAN DIFF?: NO
ANION GAP: 12 (ref 5–13)
BASOPHIL #: 0.1 10^3/UL (ref 0–0.1)
BASOPHILS %: 0.6 % (ref 0–2)
BLOOD UREA NITROGEN: 60 MG/DL (ref 7–20)
CALCIUM: 9.5 MG/DL (ref 8.4–10.2)
CARBON DIOXIDE: 21 MMOL/L (ref 21–31)
CHLORIDE: 108 MMOL/L (ref 97–110)
CREATININE: 10.67 MG/DL (ref 0.61–1.24)
EOSINOPHILS #: 0.4 10^3/UL (ref 0–0.5)
EOSINOPHILS %: 4.2 % (ref 0–7)
GLUCOSE: 111 MG/DL (ref 70–220)
HAAIG REFLEX: (no result)
HEMATOCRIT: 32.7 % (ref 42–52)
HEMOGLOBIN: 9.8 G/DL (ref 14–18)
HEPATITIS B CORE ANTIBODY: NEGATIVE
HEPATITIS B SURFACE ANTIGEN: NEGATIVE
HEPATITIS C VIRAL ANTIBODY: NEGATIVE
IMMATURE GRANS #M: 0.05 10^3/UL (ref 0–0.03)
IMMATURE GRANS % (M): 0.6 % (ref 0–0.43)
LYMPHOCYTES #: 1.3 10^3/UL (ref 0.8–2.9)
LYMPHOCYTES %: 14.5 % (ref 15–51)
MEAN CORPUSCULAR HEMOGLOBIN: 29.3 PG (ref 29–33)
MEAN CORPUSCULAR HGB CONC: 30 G/DL (ref 32–37)
MEAN CORPUSCULAR VOLUME: 97.6 FL (ref 82–101)
MEAN PLATELET VOLUME: 9.4 FL (ref 7.4–10.4)
MONOCYTE #: 0.6 10^3/UL (ref 0.3–0.9)
MONOCYTES %: 7.3 % (ref 0–11)
NEUTROPHIL #: 6.4 10^3/UL (ref 1.6–7.5)
NEUTROPHILS %: 72.8 % (ref 39–77)
NUCLEATED RED BLOOD CELLS #: 0 10^3/UL (ref 0–0)
NUCLEATED RED BLOOD CELLS%: 0 /100WBC (ref 0–0)
PLATELET COUNT: 192 10^3/UL (ref 140–415)
POTASSIUM: 5.4 MMOL/L (ref 3.5–5.1)
RED BLOOD COUNT: 3.35 10^6/UL (ref 4.7–6.1)
RED CELL DISTRIBUTION WIDTH: 15.7 % (ref 11.5–14.5)
SODIUM: 141 MMOL/L (ref 135–144)
WHITE BLOOD COUNT: 8.7 10^3/UL (ref 4.8–10.8)

## 2019-09-07 ENCOUNTER — HOSPITAL ENCOUNTER (OUTPATIENT)
Dept: HOSPITAL 10 - E/R | Age: 74
Setting detail: OBSERVATION
LOS: 2 days | Discharge: HOME | End: 2019-09-09
Attending: INTERNAL MEDICINE | Admitting: INTERNAL MEDICINE
Payer: COMMERCIAL

## 2019-09-07 ENCOUNTER — HOSPITAL ENCOUNTER (OUTPATIENT)
Dept: HOSPITAL 91 - E/R | Age: 74
Setting detail: OBSERVATION
LOS: 2 days | Discharge: HOME | End: 2019-09-09
Payer: COMMERCIAL

## 2019-09-07 VITALS — SYSTOLIC BLOOD PRESSURE: 160 MMHG | DIASTOLIC BLOOD PRESSURE: 87 MMHG | HEART RATE: 84 BPM

## 2019-09-07 VITALS
WEIGHT: 166.01 LBS | WEIGHT: 166.01 LBS | HEIGHT: 64 IN | HEIGHT: 64 IN | BODY MASS INDEX: 28.34 KG/M2 | BODY MASS INDEX: 28.34 KG/M2

## 2019-09-07 VITALS — HEART RATE: 86 BPM | SYSTOLIC BLOOD PRESSURE: 161 MMHG | DIASTOLIC BLOOD PRESSURE: 77 MMHG

## 2019-09-07 VITALS — RESPIRATION RATE: 17 BRPM | HEART RATE: 77 BPM | SYSTOLIC BLOOD PRESSURE: 157 MMHG | DIASTOLIC BLOOD PRESSURE: 76 MMHG

## 2019-09-07 VITALS — SYSTOLIC BLOOD PRESSURE: 163 MMHG | DIASTOLIC BLOOD PRESSURE: 85 MMHG | RESPIRATION RATE: 20 BRPM | HEART RATE: 78 BPM

## 2019-09-07 VITALS — DIASTOLIC BLOOD PRESSURE: 66 MMHG | SYSTOLIC BLOOD PRESSURE: 134 MMHG | HEART RATE: 86 BPM

## 2019-09-07 VITALS — HEART RATE: 84 BPM | SYSTOLIC BLOOD PRESSURE: 160 MMHG | DIASTOLIC BLOOD PRESSURE: 60 MMHG

## 2019-09-07 VITALS — HEART RATE: 78 BPM | RESPIRATION RATE: 18 BRPM | SYSTOLIC BLOOD PRESSURE: 163 MMHG | DIASTOLIC BLOOD PRESSURE: 62 MMHG

## 2019-09-07 VITALS — HEART RATE: 87 BPM | DIASTOLIC BLOOD PRESSURE: 65 MMHG | SYSTOLIC BLOOD PRESSURE: 161 MMHG

## 2019-09-07 VITALS — DIASTOLIC BLOOD PRESSURE: 67 MMHG | HEART RATE: 78 BPM | SYSTOLIC BLOOD PRESSURE: 160 MMHG

## 2019-09-07 VITALS — DIASTOLIC BLOOD PRESSURE: 75 MMHG | HEART RATE: 79 BPM | SYSTOLIC BLOOD PRESSURE: 165 MMHG

## 2019-09-07 VITALS — HEART RATE: 89 BPM | SYSTOLIC BLOOD PRESSURE: 160 MMHG | DIASTOLIC BLOOD PRESSURE: 59 MMHG

## 2019-09-07 VITALS — DIASTOLIC BLOOD PRESSURE: 81 MMHG | HEART RATE: 82 BPM | SYSTOLIC BLOOD PRESSURE: 153 MMHG

## 2019-09-07 VITALS — DIASTOLIC BLOOD PRESSURE: 78 MMHG | SYSTOLIC BLOOD PRESSURE: 156 MMHG | HEART RATE: 86 BPM

## 2019-09-07 VITALS — DIASTOLIC BLOOD PRESSURE: 72 MMHG | HEART RATE: 88 BPM | SYSTOLIC BLOOD PRESSURE: 145 MMHG

## 2019-09-07 VITALS — DIASTOLIC BLOOD PRESSURE: 72 MMHG | SYSTOLIC BLOOD PRESSURE: 145 MMHG | HEART RATE: 92 BPM

## 2019-09-07 DIAGNOSIS — Z87.891: ICD-10-CM

## 2019-09-07 DIAGNOSIS — I25.10: ICD-10-CM

## 2019-09-07 DIAGNOSIS — N18.6: ICD-10-CM

## 2019-09-07 DIAGNOSIS — E87.5: Primary | ICD-10-CM

## 2019-09-07 DIAGNOSIS — I12.0: ICD-10-CM

## 2019-09-07 DIAGNOSIS — Z99.2: ICD-10-CM

## 2019-09-07 DIAGNOSIS — I48.2: ICD-10-CM

## 2019-09-07 DIAGNOSIS — E78.5: ICD-10-CM

## 2019-09-07 DIAGNOSIS — D63.1: ICD-10-CM

## 2019-09-07 DIAGNOSIS — Z79.01: ICD-10-CM

## 2019-09-07 DIAGNOSIS — Z95.2: ICD-10-CM

## 2019-09-07 LAB
ADD MAN DIFF?: NO
ANION GAP: 16 (ref 5–13)
BASOPHIL #: 0.1 10^3/UL (ref 0–0.1)
BASOPHILS %: 0.5 % (ref 0–2)
BLOOD UREA NITROGEN: 90 MG/DL (ref 7–20)
CALCIUM: 9.7 MG/DL (ref 8.4–10.2)
CARBON DIOXIDE: 16 MMOL/L (ref 21–31)
CHLORIDE: 109 MMOL/L (ref 97–110)
CREATININE: 14.15 MG/DL (ref 0.61–1.24)
EOSINOPHILS #: 0.3 10^3/UL (ref 0–0.5)
EOSINOPHILS %: 3.4 % (ref 0–7)
GLUCOSE: 97 MG/DL (ref 70–220)
HAAIG REFLEX: (no result)
HEMATOCRIT: 33.8 % (ref 42–52)
HEMOGLOBIN: 10.2 G/DL (ref 14–18)
HEPATITIS B CORE ANTIBODY: NEGATIVE
HEPATITIS B SURFACE ANTIGEN: NEGATIVE
HEPATITIS C VIRAL ANTIBODY: NEGATIVE
IMMATURE GRANS #M: 0.03 10^3/UL (ref 0–0.03)
IMMATURE GRANS % (M): 0.3 % (ref 0–0.43)
INR: 1.73
LYMPHOCYTES #: 1.1 10^3/UL (ref 0.8–2.9)
LYMPHOCYTES %: 11.8 % (ref 15–51)
MEAN CORPUSCULAR HEMOGLOBIN: 29.2 PG (ref 29–33)
MEAN CORPUSCULAR HGB CONC: 30.2 G/DL (ref 32–37)
MEAN CORPUSCULAR VOLUME: 96.8 FL (ref 82–101)
MEAN PLATELET VOLUME: 9.6 FL (ref 7.4–10.4)
MONOCYTE #: 0.7 10^3/UL (ref 0.3–0.9)
MONOCYTES %: 7.5 % (ref 0–11)
NEUTROPHIL #: 7.2 10^3/UL (ref 1.6–7.5)
NEUTROPHILS %: 76.5 % (ref 39–77)
NUCLEATED RED BLOOD CELLS #: 0 10^3/UL (ref 0–0)
NUCLEATED RED BLOOD CELLS%: 0 /100WBC (ref 0–0)
PARTIAL THROMBOPLASTIN TIME: 36.7 SEC (ref 23–35)
PLATELET COUNT: 196 10^3/UL (ref 140–415)
POTASSIUM: 6 MMOL/L (ref 3.5–5.1)
PROTIME: 20.3 SEC (ref 11.9–14.9)
PT RATIO: 1.6
RED BLOOD COUNT: 3.49 10^6/UL (ref 4.7–6.1)
RED CELL DISTRIBUTION WIDTH: 15.9 % (ref 11.5–14.5)
SODIUM: 141 MMOL/L (ref 135–144)
WHITE BLOOD COUNT: 9.4 10^3/UL (ref 4.8–10.8)

## 2019-09-07 PROCEDURE — 85730 THROMBOPLASTIN TIME PARTIAL: CPT

## 2019-09-07 PROCEDURE — 36415 COLL VENOUS BLD VENIPUNCTURE: CPT

## 2019-09-07 PROCEDURE — 90935 HEMODIALYSIS ONE EVALUATION: CPT

## 2019-09-07 PROCEDURE — 71045 X-RAY EXAM CHEST 1 VIEW: CPT

## 2019-09-07 PROCEDURE — 99285 EMERGENCY DEPT VISIT HI MDM: CPT

## 2019-09-07 PROCEDURE — 86703 HIV-1/HIV-2 1 RESULT ANTBDY: CPT

## 2019-09-07 PROCEDURE — G0257 UNSCHED DIALYSIS ESRD PT HOS: HCPCS

## 2019-09-07 PROCEDURE — 85025 COMPLETE CBC W/AUTO DIFF WBC: CPT

## 2019-09-07 PROCEDURE — 80048 BASIC METABOLIC PNL TOTAL CA: CPT

## 2019-09-07 PROCEDURE — 87340 HEPATITIS B SURFACE AG IA: CPT

## 2019-09-07 PROCEDURE — 85610 PROTHROMBIN TIME: CPT

## 2019-09-07 PROCEDURE — 86709 HEPATITIS A IGM ANTIBODY: CPT

## 2019-09-07 PROCEDURE — 93005 ELECTROCARDIOGRAM TRACING: CPT

## 2019-09-07 PROCEDURE — 86704 HEP B CORE ANTIBODY TOTAL: CPT

## 2019-09-07 PROCEDURE — P9047 ALBUMIN (HUMAN), 25%, 50ML: HCPCS

## 2019-09-07 PROCEDURE — 86803 HEPATITIS C AB TEST: CPT

## 2019-09-07 RX ADMIN — CALCIUM GLUCONATE 1 MLS/HR: 94 INJECTION, SOLUTION INTRAVENOUS at 10:14

## 2019-09-07 RX ADMIN — WARFARIN SODIUM 1 MG: 2 TABLET ORAL at 17:28

## 2019-09-07 RX ADMIN — SEVELAMER CARBONATE SCH MG: 800 TABLET, FILM COATED ORAL at 17:28

## 2019-09-07 RX ADMIN — SEVELAMER CARBONATE 1 MG: 800 TABLET, FILM COATED ORAL at 17:28

## 2019-09-07 RX ADMIN — SEVELAMER CARBONATE 1 MG: 800 TABLET, FILM COATED ORAL at 12:15

## 2019-09-07 RX ADMIN — AMIODARONE HYDROCHLORIDE 1 MG: 200 TABLET ORAL at 21:00

## 2019-09-07 RX ADMIN — SEVELAMER CARBONATE SCH MG: 800 TABLET, FILM COATED ORAL at 12:15

## 2019-09-07 RX ADMIN — AMIODARONE HYDROCHLORIDE SCH MG: 200 TABLET ORAL at 21:00

## 2019-09-07 RX ADMIN — SODIUM BICARBONATE 1 ML: 84 INJECTION INTRAVENOUS at 10:06

## 2019-09-08 VITALS — RESPIRATION RATE: 18 BRPM | HEART RATE: 75 BPM | SYSTOLIC BLOOD PRESSURE: 159 MMHG | DIASTOLIC BLOOD PRESSURE: 75 MMHG

## 2019-09-08 VITALS — DIASTOLIC BLOOD PRESSURE: 83 MMHG | RESPIRATION RATE: 18 BRPM | SYSTOLIC BLOOD PRESSURE: 165 MMHG | HEART RATE: 75 BPM

## 2019-09-08 VITALS — RESPIRATION RATE: 18 BRPM | SYSTOLIC BLOOD PRESSURE: 164 MMHG | HEART RATE: 74 BPM | DIASTOLIC BLOOD PRESSURE: 75 MMHG

## 2019-09-08 VITALS — RESPIRATION RATE: 21 BRPM | HEART RATE: 82 BPM | SYSTOLIC BLOOD PRESSURE: 139 MMHG | DIASTOLIC BLOOD PRESSURE: 70 MMHG

## 2019-09-08 LAB
ANION GAP: 10 (ref 5–13)
BLOOD UREA NITROGEN: 47 MG/DL (ref 7–20)
CALCIUM: 9.3 MG/DL (ref 8.4–10.2)
CARBON DIOXIDE: 24 MMOL/L (ref 21–31)
CHLORIDE: 106 MMOL/L (ref 97–110)
CREATININE: 8.49 MG/DL (ref 0.61–1.24)
GLUCOSE: 99 MG/DL (ref 70–220)
HIV 1&2 ANTIBODY: NEGATIVE
POTASSIUM: 4.5 MMOL/L (ref 3.5–5.1)
SODIUM: 140 MMOL/L (ref 135–144)

## 2019-09-08 RX ADMIN — WARFARIN SODIUM 1 MG: 2 TABLET ORAL at 17:21

## 2019-09-08 RX ADMIN — AMIODARONE HYDROCHLORIDE SCH MG: 200 TABLET ORAL at 21:37

## 2019-09-08 RX ADMIN — SEVELAMER CARBONATE 1 MG: 800 TABLET, FILM COATED ORAL at 17:21

## 2019-09-08 RX ADMIN — SEVELAMER CARBONATE 1 MG: 800 TABLET, FILM COATED ORAL at 11:45

## 2019-09-08 RX ADMIN — SEVELAMER CARBONATE 1 MG: 800 TABLET, FILM COATED ORAL at 08:10

## 2019-09-08 RX ADMIN — SEVELAMER CARBONATE SCH MG: 800 TABLET, FILM COATED ORAL at 11:45

## 2019-09-08 RX ADMIN — AMIODARONE HYDROCHLORIDE 1 MG: 200 TABLET ORAL at 21:37

## 2019-09-08 RX ADMIN — SEVELAMER CARBONATE SCH MG: 800 TABLET, FILM COATED ORAL at 17:21

## 2019-09-08 RX ADMIN — SEVELAMER CARBONATE SCH MG: 800 TABLET, FILM COATED ORAL at 08:10

## 2019-09-09 VITALS — SYSTOLIC BLOOD PRESSURE: 153 MMHG | DIASTOLIC BLOOD PRESSURE: 83 MMHG | HEART RATE: 85 BPM

## 2019-09-09 VITALS — HEART RATE: 82 BPM | SYSTOLIC BLOOD PRESSURE: 149 MMHG | RESPIRATION RATE: 16 BRPM | DIASTOLIC BLOOD PRESSURE: 84 MMHG

## 2019-09-09 VITALS — HEART RATE: 82 BPM | DIASTOLIC BLOOD PRESSURE: 83 MMHG | SYSTOLIC BLOOD PRESSURE: 156 MMHG

## 2019-09-09 VITALS — SYSTOLIC BLOOD PRESSURE: 158 MMHG | HEART RATE: 83 BPM | DIASTOLIC BLOOD PRESSURE: 83 MMHG

## 2019-09-09 VITALS — DIASTOLIC BLOOD PRESSURE: 99 MMHG | HEART RATE: 82 BPM | SYSTOLIC BLOOD PRESSURE: 159 MMHG

## 2019-09-09 VITALS — SYSTOLIC BLOOD PRESSURE: 155 MMHG | DIASTOLIC BLOOD PRESSURE: 82 MMHG | HEART RATE: 73 BPM | RESPIRATION RATE: 20 BRPM

## 2019-09-09 VITALS — SYSTOLIC BLOOD PRESSURE: 159 MMHG | HEART RATE: 85 BPM | DIASTOLIC BLOOD PRESSURE: 85 MMHG

## 2019-09-09 VITALS — SYSTOLIC BLOOD PRESSURE: 139 MMHG | HEART RATE: 87 BPM | RESPIRATION RATE: 20 BRPM | DIASTOLIC BLOOD PRESSURE: 97 MMHG

## 2019-09-09 VITALS — HEART RATE: 73 BPM | SYSTOLIC BLOOD PRESSURE: 155 MMHG | DIASTOLIC BLOOD PRESSURE: 82 MMHG

## 2019-09-09 VITALS — DIASTOLIC BLOOD PRESSURE: 90 MMHG | SYSTOLIC BLOOD PRESSURE: 147 MMHG | HEART RATE: 88 BPM

## 2019-09-09 VITALS — DIASTOLIC BLOOD PRESSURE: 77 MMHG | SYSTOLIC BLOOD PRESSURE: 157 MMHG | HEART RATE: 85 BPM

## 2019-09-09 VITALS — DIASTOLIC BLOOD PRESSURE: 86 MMHG | HEART RATE: 74 BPM | SYSTOLIC BLOOD PRESSURE: 158 MMHG

## 2019-09-09 VITALS — SYSTOLIC BLOOD PRESSURE: 141 MMHG | DIASTOLIC BLOOD PRESSURE: 93 MMHG | HEART RATE: 84 BPM

## 2019-09-09 VITALS — HEART RATE: 88 BPM | SYSTOLIC BLOOD PRESSURE: 154 MMHG | DIASTOLIC BLOOD PRESSURE: 91 MMHG

## 2019-09-09 VITALS — SYSTOLIC BLOOD PRESSURE: 150 MMHG | DIASTOLIC BLOOD PRESSURE: 75 MMHG | HEART RATE: 85 BPM

## 2019-09-09 VITALS — DIASTOLIC BLOOD PRESSURE: 82 MMHG | HEART RATE: 84 BPM | SYSTOLIC BLOOD PRESSURE: 155 MMHG

## 2019-09-09 LAB
ANION GAP: 11 (ref 5–13)
BLOOD UREA NITROGEN: 69 MG/DL (ref 7–20)
CALCIUM: 9.1 MG/DL (ref 8.4–10.2)
CARBON DIOXIDE: 24 MMOL/L (ref 21–31)
CHLORIDE: 105 MMOL/L (ref 97–110)
CREATININE: 10.65 MG/DL (ref 0.61–1.24)
GLUCOSE: 79 MG/DL (ref 70–220)
INR: 2.41
POTASSIUM: 5.4 MMOL/L (ref 3.5–5.1)
PROTIME: 26.3 SEC (ref 11.9–14.9)
PT RATIO: 2.1
SODIUM: 140 MMOL/L (ref 135–144)

## 2019-09-09 RX ADMIN — AMIODARONE HYDROCHLORIDE SCH MG: 200 TABLET ORAL at 20:20

## 2019-09-09 RX ADMIN — SEVELAMER CARBONATE SCH MG: 800 TABLET, FILM COATED ORAL at 18:23

## 2019-09-09 RX ADMIN — SEVELAMER CARBONATE 1 MG: 800 TABLET, FILM COATED ORAL at 08:14

## 2019-09-09 RX ADMIN — AMIODARONE HYDROCHLORIDE 1 MG: 200 TABLET ORAL at 20:20

## 2019-09-09 RX ADMIN — WARFARIN SODIUM 1 MG: 2 TABLET ORAL at 18:23

## 2019-09-09 RX ADMIN — SEVELAMER CARBONATE 1 MG: 800 TABLET, FILM COATED ORAL at 12:18

## 2019-09-09 RX ADMIN — SEVELAMER CARBONATE SCH MG: 800 TABLET, FILM COATED ORAL at 12:18

## 2019-09-09 RX ADMIN — SEVELAMER CARBONATE SCH MG: 800 TABLET, FILM COATED ORAL at 08:14

## 2019-09-09 RX ADMIN — SEVELAMER CARBONATE 1 MG: 800 TABLET, FILM COATED ORAL at 18:23
